# Patient Record
Sex: FEMALE | Race: WHITE | Employment: OTHER | URBAN - METROPOLITAN AREA
[De-identification: names, ages, dates, MRNs, and addresses within clinical notes are randomized per-mention and may not be internally consistent; named-entity substitution may affect disease eponyms.]

---

## 2024-08-01 ENCOUNTER — APPOINTMENT (OUTPATIENT)
Dept: CT IMAGING | Facility: HOSPITAL | Age: 69
End: 2024-08-01
Attending: EMERGENCY MEDICINE
Payer: MEDICAID

## 2024-08-01 ENCOUNTER — HOSPITAL ENCOUNTER (INPATIENT)
Facility: HOSPITAL | Age: 69
LOS: 1 days | Discharge: HOME | End: 2024-08-02
Attending: EMERGENCY MEDICINE | Admitting: HOSPITALIST
Payer: MEDICAID

## 2024-08-01 DIAGNOSIS — R51.9 ACUTE NONINTRACTABLE HEADACHE, UNSPECIFIED HEADACHE TYPE: ICD-10-CM

## 2024-08-01 DIAGNOSIS — E87.1 HYPONATREMIA: Primary | ICD-10-CM

## 2024-08-01 LAB
ALBUMIN SERPL-MCNC: 4.7 G/DL (ref 3.2–4.8)
ALBUMIN/GLOB SERPL: 1.9 {RATIO} (ref 1–2)
ALP LIVER SERPL-CCNC: 99 U/L
ALT SERPL-CCNC: 19 U/L
ANION GAP SERPL CALC-SCNC: 8 MMOL/L (ref 0–18)
AST SERPL-CCNC: 23 U/L (ref ?–34)
BASOPHILS # BLD AUTO: 0.03 X10(3) UL (ref 0–0.2)
BASOPHILS NFR BLD AUTO: 0.5 %
BILIRUB SERPL-MCNC: 0.5 MG/DL (ref 0.2–1.1)
BILIRUB UR QL: NEGATIVE
BUN BLD-MCNC: 10 MG/DL (ref 9–23)
BUN/CREAT SERPL: 14.7 (ref 10–20)
CALCIUM BLD-MCNC: 9.2 MG/DL (ref 8.7–10.4)
CHLORIDE SERPL-SCNC: 92 MMOL/L (ref 98–112)
CLARITY UR: CLEAR
CO2 SERPL-SCNC: 22 MMOL/L (ref 21–32)
CREAT BLD-MCNC: 0.68 MG/DL
DEPRECATED RDW RBC AUTO: 40.6 FL (ref 35.1–46.3)
EGFRCR SERPLBLD CKD-EPI 2021: 94 ML/MIN/1.73M2 (ref 60–?)
EOSINOPHIL # BLD AUTO: 0.05 X10(3) UL (ref 0–0.7)
EOSINOPHIL NFR BLD AUTO: 0.9 %
ERYTHROCYTE [DISTWIDTH] IN BLOOD BY AUTOMATED COUNT: 13.2 % (ref 11–15)
EST. AVERAGE GLUCOSE BLD GHB EST-MCNC: 148 MG/DL (ref 68–126)
FLUAV + FLUBV RNA SPEC NAA+PROBE: NEGATIVE
FLUAV + FLUBV RNA SPEC NAA+PROBE: NEGATIVE
GLOBULIN PLAS-MCNC: 2.5 G/DL (ref 2–3.5)
GLUCOSE BLD-MCNC: 134 MG/DL (ref 70–99)
GLUCOSE BLDC GLUCOMTR-MCNC: 146 MG/DL (ref 70–99)
GLUCOSE UR-MCNC: NORMAL MG/DL
HBA1C MFR BLD: 6.8 % (ref ?–5.7)
HCT VFR BLD AUTO: 32.5 %
HGB BLD-MCNC: 11.2 G/DL
HGB UR QL STRIP.AUTO: NEGATIVE
IMM GRANULOCYTES # BLD AUTO: 0.01 X10(3) UL (ref 0–1)
IMM GRANULOCYTES NFR BLD: 0.2 %
KETONES UR-MCNC: NEGATIVE MG/DL
LEUKOCYTE ESTERASE UR QL STRIP.AUTO: 75
LYMPHOCYTES # BLD AUTO: 2.16 X10(3) UL (ref 1–4)
LYMPHOCYTES NFR BLD AUTO: 37.9 %
MCH RBC QN AUTO: 29 PG (ref 26–34)
MCHC RBC AUTO-ENTMCNC: 34.5 G/DL (ref 31–37)
MCV RBC AUTO: 84.2 FL
MONOCYTES # BLD AUTO: 0.32 X10(3) UL (ref 0.1–1)
MONOCYTES NFR BLD AUTO: 5.6 %
NEUTROPHILS # BLD AUTO: 3.13 X10 (3) UL (ref 1.5–7.7)
NEUTROPHILS # BLD AUTO: 3.13 X10(3) UL (ref 1.5–7.7)
NEUTROPHILS NFR BLD AUTO: 54.9 %
NITRITE UR QL STRIP.AUTO: NEGATIVE
OSMOLALITY SERPL CALC.SUM OF ELEC: 255 MOSM/KG (ref 275–295)
PH UR: 6.5 [PH] (ref 5–8)
PLATELET # BLD AUTO: 292 10(3)UL (ref 150–450)
POTASSIUM SERPL-SCNC: 4 MMOL/L (ref 3.5–5.1)
PROT SERPL-MCNC: 7.2 G/DL (ref 5.7–8.2)
PROT UR-MCNC: NEGATIVE MG/DL
RBC # BLD AUTO: 3.86 X10(6)UL
RSV RNA SPEC NAA+PROBE: NEGATIVE
SARS-COV-2 RNA RESP QL NAA+PROBE: NOT DETECTED
SODIUM SERPL-SCNC: 122 MMOL/L (ref 136–145)
SP GR UR STRIP: 1.01 (ref 1–1.03)
TSI SER-ACNC: 2.55 MIU/ML (ref 0.55–4.78)
UROBILINOGEN UR STRIP-ACNC: NORMAL
WBC # BLD AUTO: 5.7 X10(3) UL (ref 4–11)

## 2024-08-01 PROCEDURE — 70496 CT ANGIOGRAPHY HEAD: CPT | Performed by: EMERGENCY MEDICINE

## 2024-08-01 PROCEDURE — 99223 1ST HOSP IP/OBS HIGH 75: CPT | Performed by: HOSPITALIST

## 2024-08-01 RX ORDER — NICOTINE POLACRILEX 4 MG
15 LOZENGE BUCCAL
Status: DISCONTINUED | OUTPATIENT
Start: 2024-08-01 | End: 2024-08-02

## 2024-08-01 RX ORDER — TEMAZEPAM 15 MG/1
15 CAPSULE ORAL NIGHTLY PRN
Status: DISCONTINUED | OUTPATIENT
Start: 2024-08-01 | End: 2024-08-02

## 2024-08-01 RX ORDER — ONDANSETRON 2 MG/ML
4 INJECTION INTRAMUSCULAR; INTRAVENOUS EVERY 6 HOURS PRN
Status: CANCELLED | OUTPATIENT
Start: 2024-08-01

## 2024-08-01 RX ORDER — LISINOPRIL 20 MG/1
20 TABLET ORAL DAILY
COMMUNITY

## 2024-08-01 RX ORDER — DIPHENHYDRAMINE HYDROCHLORIDE 50 MG/ML
12.5 INJECTION INTRAMUSCULAR; INTRAVENOUS ONCE
Status: COMPLETED | OUTPATIENT
Start: 2024-08-01 | End: 2024-08-01

## 2024-08-01 RX ORDER — SODIUM CHLORIDE 9 MG/ML
INJECTION, SOLUTION INTRAVENOUS ONCE
Status: COMPLETED | OUTPATIENT
Start: 2024-08-01 | End: 2024-08-01

## 2024-08-01 RX ORDER — METOCLOPRAMIDE HYDROCHLORIDE 5 MG/ML
10 INJECTION INTRAMUSCULAR; INTRAVENOUS EVERY 8 HOURS PRN
Status: CANCELLED | OUTPATIENT
Start: 2024-08-01

## 2024-08-01 RX ORDER — NICOTINE POLACRILEX 4 MG
15 LOZENGE BUCCAL
Status: CANCELLED | OUTPATIENT
Start: 2024-08-01

## 2024-08-01 RX ORDER — DEXTROSE MONOHYDRATE AND SODIUM CHLORIDE 5; .9 G/100ML; G/100ML
INJECTION, SOLUTION INTRAVENOUS CONTINUOUS
Status: CANCELLED | OUTPATIENT
Start: 2024-08-01

## 2024-08-01 RX ORDER — NICOTINE POLACRILEX 4 MG
30 LOZENGE BUCCAL
Status: CANCELLED | OUTPATIENT
Start: 2024-08-01

## 2024-08-01 RX ORDER — DEXTROSE MONOHYDRATE 25 G/50ML
50 INJECTION, SOLUTION INTRAVENOUS
Status: DISCONTINUED | OUTPATIENT
Start: 2024-08-01 | End: 2024-08-02

## 2024-08-01 RX ORDER — ACETAMINOPHEN 500 MG
500 TABLET ORAL EVERY 4 HOURS PRN
Status: CANCELLED | OUTPATIENT
Start: 2024-08-01

## 2024-08-01 RX ORDER — ONDANSETRON 2 MG/ML
4 INJECTION INTRAMUSCULAR; INTRAVENOUS EVERY 6 HOURS PRN
Status: DISCONTINUED | OUTPATIENT
Start: 2024-08-01 | End: 2024-08-02

## 2024-08-01 RX ORDER — HEPARIN SODIUM 5000 [USP'U]/ML
5000 INJECTION, SOLUTION INTRAVENOUS; SUBCUTANEOUS EVERY 12 HOURS SCHEDULED
Status: CANCELLED | OUTPATIENT
Start: 2024-08-01

## 2024-08-01 RX ORDER — NICOTINE POLACRILEX 4 MG
30 LOZENGE BUCCAL
Status: DISCONTINUED | OUTPATIENT
Start: 2024-08-01 | End: 2024-08-02

## 2024-08-01 RX ORDER — DEXTROSE MONOHYDRATE 25 G/50ML
50 INJECTION, SOLUTION INTRAVENOUS
Status: CANCELLED | OUTPATIENT
Start: 2024-08-01

## 2024-08-01 RX ORDER — TEMAZEPAM 15 MG/1
15 CAPSULE ORAL NIGHTLY PRN
Status: CANCELLED | OUTPATIENT
Start: 2024-08-01

## 2024-08-01 RX ORDER — METOCLOPRAMIDE HYDROCHLORIDE 5 MG/ML
10 INJECTION INTRAMUSCULAR; INTRAVENOUS EVERY 8 HOURS PRN
Status: DISCONTINUED | OUTPATIENT
Start: 2024-08-01 | End: 2024-08-02

## 2024-08-01 RX ORDER — METOCLOPRAMIDE HYDROCHLORIDE 5 MG/ML
10 INJECTION INTRAMUSCULAR; INTRAVENOUS ONCE
Status: COMPLETED | OUTPATIENT
Start: 2024-08-01 | End: 2024-08-01

## 2024-08-01 RX ORDER — ACETAMINOPHEN 500 MG
500 TABLET ORAL EVERY 4 HOURS PRN
Status: DISCONTINUED | OUTPATIENT
Start: 2024-08-01 | End: 2024-08-02

## 2024-08-01 RX ORDER — ATORVASTATIN CALCIUM 10 MG/1
10 TABLET, FILM COATED ORAL NIGHTLY
COMMUNITY

## 2024-08-01 RX ORDER — SODIUM CHLORIDE 9 MG/ML
INJECTION, SOLUTION INTRAVENOUS CONTINUOUS
Status: DISCONTINUED | OUTPATIENT
Start: 2024-08-01 | End: 2024-08-02

## 2024-08-01 RX ORDER — HEPARIN SODIUM 5000 [USP'U]/ML
5000 INJECTION, SOLUTION INTRAVENOUS; SUBCUTANEOUS EVERY 12 HOURS SCHEDULED
Status: DISCONTINUED | OUTPATIENT
Start: 2024-08-01 | End: 2024-08-02

## 2024-08-01 NOTE — ED PROVIDER NOTES
Patient Seen in: [facility] Emergency Department      History     Chief Complaint   Patient presents with    Headache    Abdominal Pain     Stated Complaint: Headache    Subjective:   HPI    69-year-old female presents for evaluation of headache.  Patient reports headache that began at 930 this morning, progressively worsened.  Was in her usual state of health when she woke up this morning, able to do her usual 2 mile walk without difficulty.  As the day progressed headache worsened, no relief with Tylenol.  This was associated with nausea as well as photophobia.  No fever, cough, recent trauma, vomiting, diarrhea, urinary complaints.    Objective:   History reviewed. No pertinent past medical history.           History reviewed. No pertinent surgical history.             Social History     Socioeconomic History    Marital status: Single   Tobacco Use    Smoking status: Never    Smokeless tobacco: Never   Vaping Use    Vaping status: Never Used   Substance and Sexual Activity    Alcohol use: Never    Drug use: Never              Review of Systems    Positive for stated Chief Complaint: Headache and Abdominal Pain    Other systems are as noted in HPI.  Constitutional and vital signs reviewed.      All other systems reviewed and negative except as noted above.    Physical Exam     ED Triage Vitals [08/01/24 1721]   BP (!) 169/83   Pulse 72   Resp 20   Temp 97.7 °F (36.5 °C)   Temp src Temporal   SpO2 99 %   O2 Device None (Room air)       Current Vitals:   Vital Signs  BP: 132/79  Pulse: 78  Resp: 20  Temp: 97.7 °F (36.5 °C)  Temp src: Temporal  MAP (mmHg): 93    Oxygen Therapy  SpO2: 100 %  O2 Device: None (Room air)            Physical Exam  Vitals and nursing note reviewed.   Constitutional:       General: She is not in acute distress.     Appearance: She is well-developed.   HENT:      Head: Normocephalic and atraumatic.   Eyes:      Extraocular Movements: Extraocular movements intact.       Conjunctiva/sclera: Conjunctivae normal.      Pupils: Pupils are equal, round, and reactive to light.   Cardiovascular:      Rate and Rhythm: Normal rate and regular rhythm.      Heart sounds: Normal heart sounds.   Pulmonary:      Effort: Pulmonary effort is normal. No respiratory distress.      Breath sounds: Normal breath sounds.   Abdominal:      General: Bowel sounds are normal. There is no distension.      Palpations: Abdomen is soft.      Tenderness: There is no abdominal tenderness. There is no guarding or rebound.   Musculoskeletal:         General: Normal range of motion.      Cervical back: Normal range of motion and neck supple.   Skin:     General: Skin is warm and dry.      Findings: No rash.   Neurological:      General: No focal deficit present.      Mental Status: She is alert and oriented to person, place, and time.      Cranial Nerves: No cranial nerve deficit.      Sensory: No sensory deficit.      Motor: No weakness.               ED Course     Labs Reviewed   COMP METABOLIC PANEL (14) - Abnormal; Notable for the following components:       Result Value    Glucose 134 (*)     Sodium 122 (*)     Chloride 92 (*)     Calculated Osmolality 255 (*)     All other components within normal limits   URINALYSIS WITH CULTURE REFLEX - Abnormal; Notable for the following components:    Leukocyte Esterase Urine 75 (*)     Squamous Epi. Cells Few (*)     All other components within normal limits   CBC W/ DIFFERENTIAL - Abnormal; Notable for the following components:    HGB 11.2 (*)     HCT 32.5 (*)     All other components within normal limits   TSH W REFLEX TO FREE T4 - Normal   SARS-COV-2/FLU A AND B/RSV BY PCR (GENEXPERT) - Normal    Narrative:     This test is intended for the qualitative detection and differentiation of SARS-CoV-2, influenza A, influenza B, and respiratory syncytial virus (RSV) viral RNA in nasopharyngeal or nares swabs from individuals suspected of respiratory viral infection consistent  with COVID-19 by their healthcare provider. Signs and symptoms of respiratory viral infection due to SARS-CoV-2, influenza, and RSV can be similar.    Test performed using the Xpert Xpress SARS-CoV-2/FLU/RSV (real time RT-PCR)  assay on the GeneXpert instrument, [employer], [place] [address].   This test is being used under the Food and Drug Administration's Emergency Use Authorization.    The authorized Fact Sheet for Healthcare Providers for this assay is available upon request from the laboratory.   CBC WITH DIFFERENTIAL WITH PLATELET    Narrative:     The following orders were created for panel order CBC With Differential With Platelet.  Procedure                               Abnormality         Status                     ---------                               -----------         ------                     CBC W/ DIFFERENTIAL[[id number]]          Abnormal            Final result                 Please view results for these tests on the individual orders.   RAINBOW DRAW LAVENDER   RAINBOW DRAW LIGHT GREEN   URINE CULTURE, ROUTINE             CTA BRAIN (CPT=70496)    Result Date: 8/1/2024  CONCLUSION:  1.  Noncontrast head CT:  No acute intracranial process.   There are mild microvascular white matter ischemic changes, likely related to long-standing hypertension and/or diabetes. 2.  CTA head:  No stenosis, occlusion, or gross aneurysm in the anterior or posterior circulation.  Dictated by (CST): Wiley Friend MD on 8/01/2024 at 7:26 PM     Finalized by (CST): Wiley Friend MD on 8/01/2024 at 7:30 PM                  MDM        Admission disposition: 8/1/2024  7:21 PM                                        Medical Decision Making  Differential diagnosis includes but is not limited to viral illness, dehydration, tension headache, migraine, malignancy, subarachnoid hemorrhage    Patient with normal exam, sodium of 122 noted with otherwise normal electrolytes and glucose.  Patient reports about a year ago she had  hyponatremia as well, unsure of etiology.  No recent vomiting or diarrhea, not on diuretics.  Plan for IV fluids with migraine medications, patient reports resolution of symptoms after this, feeling much better.  Discussed with and admitted to .    Problems Addressed:  Hyponatremia: acute illness or injury    Amount and/or Complexity of Data Reviewed  External Data Reviewed: labs.     Details: EMR reviewed, no previous labs available for comparison  Labs: ordered.  Radiology: ordered.  Discussion of management or test interpretation with external provider(s): Discussed with and admitted to hospitalist        Disposition and Plan     Clinical Impression:  1. Hyponatremia    2. Acute nonintractable headache, unspecified headache type         Disposition:  Admit  8/1/2024  7:21 pm    Follow-up:  No follow-up provider specified.  We recommend that you schedule follow up care with a primary care provider within the next three months to obtain basic health screening including reassessment of your blood pressure.      Medications Prescribed:  There are no discharge medications for this patient.                        Hospital Problems       Present on Admission             ICD-10-CM Noted POA    * (Principal) Hyponatremia E87.1 8/1/2024 Unknown

## 2024-08-01 NOTE — ED INITIAL ASSESSMENT (HPI)
Pt arrives through triage with daughter      complaints of, HA and abd discomfort/ nausea. Pt report THOMPSON started this AM. Denies vomiting or diarrhea. Denies blurry/ double vision        Hx of DM, HLD, HTN

## 2024-08-02 VITALS
RESPIRATION RATE: 16 BRPM | TEMPERATURE: 97 F | DIASTOLIC BLOOD PRESSURE: 59 MMHG | WEIGHT: 129.81 LBS | HEIGHT: 61 IN | HEART RATE: 75 BPM | BODY MASS INDEX: 24.51 KG/M2 | SYSTOLIC BLOOD PRESSURE: 143 MMHG | OXYGEN SATURATION: 100 %

## 2024-08-02 LAB
ANION GAP SERPL CALC-SCNC: 5 MMOL/L (ref 0–18)
BASOPHILS # BLD AUTO: 0.02 X10(3) UL (ref 0–0.2)
BASOPHILS NFR BLD AUTO: 0.6 %
BUN BLD-MCNC: 7 MG/DL (ref 9–23)
BUN/CREAT SERPL: 10.8 (ref 10–20)
CALCIUM BLD-MCNC: 8.8 MG/DL (ref 8.7–10.4)
CHLORIDE SERPL-SCNC: 103 MMOL/L (ref 98–112)
CO2 SERPL-SCNC: 23 MMOL/L (ref 21–32)
CREAT BLD-MCNC: 0.65 MG/DL
DEPRECATED RDW RBC AUTO: 41.1 FL (ref 35.1–46.3)
EGFRCR SERPLBLD CKD-EPI 2021: 95 ML/MIN/1.73M2 (ref 60–?)
EOSINOPHIL # BLD AUTO: 0.06 X10(3) UL (ref 0–0.7)
EOSINOPHIL NFR BLD AUTO: 1.9 %
ERYTHROCYTE [DISTWIDTH] IN BLOOD BY AUTOMATED COUNT: 13.4 % (ref 11–15)
GLUCOSE BLD-MCNC: 134 MG/DL (ref 70–99)
GLUCOSE BLDC GLUCOMTR-MCNC: 119 MG/DL (ref 70–99)
GLUCOSE BLDC GLUCOMTR-MCNC: 144 MG/DL (ref 70–99)
HCT VFR BLD AUTO: 31.4 %
HGB BLD-MCNC: 10.8 G/DL
IMM GRANULOCYTES # BLD AUTO: 0.01 X10(3) UL (ref 0–1)
IMM GRANULOCYTES NFR BLD: 0.3 %
LYMPHOCYTES # BLD AUTO: 1.27 X10(3) UL (ref 1–4)
LYMPHOCYTES NFR BLD AUTO: 40.8 %
MAGNESIUM SERPL-MCNC: 1.7 MG/DL (ref 1.6–2.6)
MCH RBC QN AUTO: 28.7 PG (ref 26–34)
MCHC RBC AUTO-ENTMCNC: 34.4 G/DL (ref 31–37)
MCV RBC AUTO: 83.5 FL
MONOCYTES # BLD AUTO: 0.2 X10(3) UL (ref 0.1–1)
MONOCYTES NFR BLD AUTO: 6.4 %
NEUTROPHILS # BLD AUTO: 1.55 X10 (3) UL (ref 1.5–7.7)
NEUTROPHILS # BLD AUTO: 1.55 X10(3) UL (ref 1.5–7.7)
NEUTROPHILS NFR BLD AUTO: 50 %
OSMOLALITY SERPL CALC.SUM OF ELEC: 272 MOSM/KG (ref 275–295)
PLATELET # BLD AUTO: 268 10(3)UL (ref 150–450)
POTASSIUM SERPL-SCNC: 4.1 MMOL/L (ref 3.5–5.1)
RBC # BLD AUTO: 3.76 X10(6)UL
SODIUM SERPL-SCNC: 131 MMOL/L (ref 136–145)
WBC # BLD AUTO: 3.1 X10(3) UL (ref 4–11)

## 2024-08-02 PROCEDURE — 99239 HOSP IP/OBS DSCHRG MGMT >30: CPT | Performed by: HOSPITALIST

## 2024-08-02 RX ORDER — ATORVASTATIN CALCIUM 10 MG/1
10 TABLET, FILM COATED ORAL NIGHTLY
Status: DISCONTINUED | OUTPATIENT
Start: 2024-08-02 | End: 2024-08-02

## 2024-08-02 RX ORDER — MAGNESIUM OXIDE 400 MG/1
400 TABLET ORAL ONCE
Status: COMPLETED | OUTPATIENT
Start: 2024-08-02 | End: 2024-08-02

## 2024-08-02 RX ORDER — LISINOPRIL 20 MG/1
20 TABLET ORAL DAILY
Status: DISCONTINUED | OUTPATIENT
Start: 2024-08-02 | End: 2024-08-02

## 2024-08-02 RX ORDER — PANTOPRAZOLE SODIUM 40 MG/1
40 TABLET, DELAYED RELEASE ORAL
Status: DISCONTINUED | OUTPATIENT
Start: 2024-08-02 | End: 2024-08-02

## 2024-08-02 NOTE — PLAN OF CARE
Patient is from home w/ daughter. A&Ox4. Room air. Hep subcutaneous. Patient is a self in room. Ready for discharge. IV removed. Tele removed and returned. Nurse  cleared. Patient's son here and available for transportation home.     Problem: Patient Centered Care  Goal: Patient preferences are identified and integrated in the patient's plan of care  Description: Interventions:  - What would you like us to know as we care for you? From home w/ daughter  - Provide timely, complete, and accurate information to patient/family  - Incorporate patient and family knowledge, values, beliefs, and cultural backgrounds into the planning and delivery of care  - Encourage patient/family to participate in care and decision-making at the level they choose  - Honor patient and family perspectives and choices  Outcome: Progressing     Problem: Diabetes/Glucose Control  Goal: Glucose maintained within prescribed range  Description: INTERVENTIONS:  - Monitor Blood Glucose as ordered  - Assess for signs and symptoms of hyperglycemia and hypoglycemia  - Administer ordered medications to maintain glucose within target range  - Assess barriers to adequate nutritional intake and initiate nutrition consult as needed  - Instruct patient on self management of diabetes  Outcome: Progressing     Problem: Patient/Family Goals  Goal: Patient/Family Long Term Goal  Description: Patient's Long Term Goal: To be discharged    Interventions:  - Assist patient to all tests and procedures  - See additional Care Plan goals for specific interventions  Outcome: Progressing  Goal: Patient/Family Short Term Goal  Description: Patient's Short Term Goal: To feel better    Interventions:   - Assist patients to all tests and procedures  - See additional Care Plan goals for specific interventions  Outcome: Progressing     Problem: CARDIOVASCULAR - ADULT  Goal: Maintains optimal cardiac output and hemodynamic stability  Description: INTERVENTIONS:  - Monitor  vital signs, rhythm, and trends  - Monitor for bleeding, hypotension and signs of decreased cardiac output  - Evaluate effectiveness of vasoactive medications to optimize hemodynamic stability  - Monitor arterial and/or venous puncture sites for bleeding and/or hematoma  - Assess quality of pulses, skin color and temperature  - Assess for signs of decreased coronary artery perfusion - ex. Angina  - Evaluate fluid balance, assess for edema, trend weights  Outcome: Progressing  Goal: Absence of cardiac arrhythmias or at baseline  Description: INTERVENTIONS:  - Continuous cardiac monitoring, monitor vital signs, obtain 12 lead EKG if indicated  - Evaluate effectiveness of antiarrhythmic and heart rate control medications as ordered  - Initiate emergency measures for life threatening arrhythmias  - Monitor electrolytes and administer replacement therapy as ordered  Outcome: Progressing     Problem: METABOLIC/FLUID AND ELECTROLYTES - ADULT  Goal: Glucose maintained within prescribed range  Description: INTERVENTIONS:  - Monitor Blood Glucose as ordered  - Assess for signs and symptoms of hyperglycemia and hypoglycemia  - Administer ordered medications to maintain glucose within target range  - Assess barriers to adequate nutritional intake and initiate nutrition consult as needed  - Instruct patient on self management of diabetes  Outcome: Progressing  Goal: Electrolytes maintained within normal limits  Description: INTERVENTIONS:  - Monitor labs and rhythm and assess patient for signs and symptoms of electrolyte imbalances  - Administer electrolyte replacement as ordered  - Monitor response to electrolyte replacements, including rhythm and repeat lab results as appropriate  - Fluid restriction as ordered  - Instruct patient on fluid and nutrition restrictions as appropriate  Outcome: Progressing  Goal: Hemodynamic stability and optimal renal function maintained  Description: INTERVENTIONS:  - Monitor labs and assess for  signs and symptoms of volume excess or deficit  - Monitor intake, output and patient weight  - Monitor urine specific gravity, serum osmolarity and serum sodium as indicated or ordered  - Monitor response to interventions for patient's volume status, including labs, urine output, blood pressure (other measures as available)  - Encourage oral intake as appropriate  - Instruct patient on fluid and nutrition restrictions as appropriate  Outcome: Progressing     Problem: SAFETY ADULT - FALL  Goal: Free from fall injury  Description: INTERVENTIONS:  - Assess pt frequently for physical needs  - Identify cognitive and physical deficits and behaviors that affect risk of falls.  - [facility] fall precautions as indicated by assessment.  - Educate pt/family on patient safety including physical limitations  - Instruct pt to call for assistance with activity based on assessment  - Modify environment to reduce risk of injury  - Provide assistive devices as appropriate  - Consider OT/PT consult to assist with strengthening/mobility  - Encourage toileting schedule  Outcome: Progressing     Problem: DISCHARGE PLANNING  Goal: Discharge to home or other facility with appropriate resources  Description: INTERVENTIONS:  - Identify barriers to discharge w/pt and caregiver  - Include patient/family/discharge partner in discharge planning  - Arrange for needed discharge resources and transportation as appropriate  - Identify discharge learning needs (meds, wound care, etc)  - Arrange for interpreters to assist at discharge as needed  - Consider post-discharge preferences of patient/family/discharge partner  - Complete POLST form as appropriate  - Assess patient's ability to be responsible for managing their own health  - Refer to Case Management Department for coordinating discharge planning if the patient needs post-hospital services based on physician/LIP order or complex needs related to functional status, cognitive ability or social  support system  Outcome: Progressing

## 2024-08-02 NOTE — PAYOR COMM NOTE
--------------  ADMISSION REVIEW     Payor: [facility]  Subscriber #:  [id number]  Authorization Number: [id number]    Admit date: 8/1/24  Admit time:  8:55 PM       REVIEW DOCUMENTATION:    Patient Seen in: [facility] Emergency Department      History     Chief Complaint   Patient presents with    Headache    Abdominal Pain     Stated Complaint: Headache    Subjective:   HPI    69-year-old female presents for evaluation of headache.  Patient reports headache that began at 930 this morning, progressively worsened.  Was in her usual state of health when she woke up this morning, able to do her usual 2 mile walk without difficulty.  As the day progressed headache worsened, no relief with Tylenol.  This was associated with nausea as well as photophobia.  No fever, cough, recent trauma, vomiting, diarrhea, urinary complaints.      Physical Exam     ED Triage Vitals [08/01/24 1721]   BP (!) 169/83   Pulse 72   Resp 20   Temp 97.7 °F (36.5 °C)   Temp src Temporal   SpO2 99 %   O2 Device None (Room air)       Current Vitals:   Vital Signs  BP: 132/79  Pulse: 78  Resp: 20  Temp: 97.7 °F (36.5 °C)  Temp src: Temporal  MAP (mmHg): 93    Oxygen Therapy  SpO2: 100 %  O2 Device: None (Room air)      Physical Exam  Vitals and nursing note reviewed.   Constitutional:       General: She is not in acute distress.     Appearance: She is well-developed.   HENT:      Head: Normocephalic and atraumatic.   Eyes:      Extraocular Movements: Extraocular movements intact.      Conjunctiva/sclera: Conjunctivae normal.      Pupils: Pupils are equal, round, and reactive to light.   Cardiovascular:      Rate and Rhythm: Normal rate and regular rhythm.      Heart sounds: Normal heart sounds.   Pulmonary:      Effort: Pulmonary effort is normal. No respiratory distress.      Breath sounds: Normal breath sounds.   Abdominal:      General: Bowel sounds are normal. There is no distension.      Palpations:  Abdomen is soft.      Tenderness: There is no abdominal tenderness. There is no guarding or rebound.   Musculoskeletal:         General: Normal range of motion.      Cervical back: Normal range of motion and neck supple.   Skin:     General: Skin is warm and dry.      Findings: No rash.   Neurological:      General: No focal deficit present.      Mental Status: She is alert and oriented to person, place, and time.      Cranial Nerves: No cranial nerve deficit.      Sensory: No sensory deficit.      Motor: No weakness.         ED Course     Labs Reviewed   COMP METABOLIC PANEL (14) - Abnormal; Notable for the following components:       Result Value    Glucose 134 (*)     Sodium 122 (*)     Chloride 92 (*)     Calculated Osmolality 255 (*)     All other components within normal limits   URINALYSIS WITH CULTURE REFLEX - Abnormal; Notable for the following components:    Leukocyte Esterase Urine 75 (*)     Squamous Epi. Cells Few (*)     All other components within normal limits   CBC W/ DIFFERENTIAL - Abnormal; Notable for the following components:    HGB 11.2 (*)     HCT 32.5 (*)     All other components within normal limits   TSH W REFLEX TO FREE T4 - Normal   SARS-COV-2/FLU A AND B/RSV BY PCR (GENEXPERT) - Normal   URINE CULTURE, ROUTINE      CTA BRAIN (CPT=70496)    Result Date: 8/1/2024  CONCLUSION:  1.  Noncontrast head CT:  No acute intracranial process.   There are mild microvascular white matter ischemic changes, likely related to long-standing hypertension and/or diabetes. 2.  CTA head:  No stenosis, occlusion, or gross aneurysm in the anterior or posterior circulation.    Disposition and Plan     Clinical Impression:  1. Hyponatremia    2. Acute nonintractable headache, unspecified headache type         Disposition:  Admit  8/1/2024  7:21 pm    Signed by Meghan Guy MD on 8/1/2024  8:20 PM             HISTORY AND PHYSICAL EXAMINATION     CHIEF COMPLAINT:  Hypoosmolar, hyponatremia.      HISTORY OF  PRESENT ILLNESS:  Patient is a 69-year-old  female who came into the Emergency Department for further evaluation of tension-type headache since this morning.  CBC showed white blood cell count of 5.7, hemoglobin 11.7.  Chemistry showed sodium 122, chloride 92.  Calculated osmolality 255.  Urinalysis was unremarkable.  GeneXpert panel was negative.  TSH with reflex T4 still pending.  CT scan of the brain still pending.  Patient will be admitted to the hospital for further management.        ASSESSMENT:    1.       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  CT scan of the brain still pending.  Other inducers of hypoosmolar or hyponatremia are pain associated with headache and nausea.  2.       Essential hypertension.   3.       Diabetes mellitus type 2.      PLAN:  Patient will be admitted to general medical floor, remote telemetry.  Will restrict free water to 1200 mL daily.  Gentle slow infusion of normal saline.  Monitor her chemistry in the morning.  Obtain TSH with reflex T4, full precautions.  Monitor Accu-Cheks      MEDICATIONS ADMINISTERED IN LAST 1 DAY:  diphenhydrAMINE (Benadryl) 50 mg/mL  injection 12.5 mg       Date Action Dose Route User    8/1/2024 1845 Given 12.5 mg Intravenous ImRobbi padilla RN          heparin (Porcine) 5000 UNIT/ML injection 5,000 Units       Date Action Dose Route User    8/2/2024 0909 Given 5,000 Units Subcutaneous (Right Lower Abdomen) Mirian Wiggins RN    8/1/2024 2330 Given 5,000 Units Subcutaneous (Left Lower Abdomen) Dara Dotson RN          iopamidol 76% (ISOVUE-370) injection for power injector       Date Action Dose Route User    8/1/2024 1911 Given 80 mL Intravenous Deyanira Velasco          lisinopril (Prinivil; Zestril) tab 20 mg       Date Action Dose Route User    8/2/2024 0910 Given 20 mg Oral Mirian Wiggins RN          magnesium oxide (Mag-Ox) tab 400 mg       Date Action Dose Route User    8/2/2024 0910 Given 400 mg  Oral Mirian Wiggins RN          metoclopramide (Reglan) 5 mg/mL injection 10 mg       Date Action Dose Route User    8/1/2024 1846 Given 10 mg Intravenous Robbi Jacobs RN          pantoprazole (Protonix) DR tab 40 mg       Date Action Dose Route User    8/2/2024 0607 Given 40 mg Oral OrtuostDara syed RN          sodium chloride 0.9% infusion       Date Action Dose Route User    8/2/2024 0512 New Bag (none) Intravenous OrtjuanstDara syed RN          sodium chloride 0.9% infusion 83 ml hr       Date Action Dose Route User    8/1/2024 1955 New Bag (none) Intravenous Robbi Jacobs RN          sodium chloride 0.9 % IV bolus 1,000 mL       Date Action Dose Route User    8/1/2024 1830 New Bag 1,000 mL Intravenous Robbi Jacobs RN            Vitals (last day)       Date/Time Temp Pulse Resp BP SpO2 Weight O2 Device O2 Flow Rate (L/min) [facility]    08/02/24 0858 97.3 °F (36.3 °C) 75 16 143/59 100 % -- None (Room air) -- JL    08/02/24 0511 97.9 °F (36.6 °C) 72 15 138/70 97 % -- None (Room air) -- GO    08/02/24 0500 -- -- -- -- -- 129 lb 12.8 oz (58.9 kg) -- -- FS    08/01/24 2110 98 °F (36.7 °C) 67 14 122/65 98 % -- None (Room air) -- GO    08/01/24 2059 -- 70 -- -- -- -- -- -- GT    08/01/24 2015 -- 71 -- 119/62 100 % -- -- -- KI    08/01/24 1930 -- 78 -- 132/79 100 % -- -- -- KI    08/01/24 1822 -- -- -- -- -- -- None (Room air) -- KI    08/01/24 1721 97.7 °F (36.5 °C) 72 20 169/83 99 % 135 lb (61.2 kg) None (Room air) -- KS

## 2024-08-02 NOTE — H&P
[facility]    PATIENT'S NAME: CAITY GASTELUM   ATTENDING PHYSICIAN: Meghan Guy MD   PATIENT ACCOUNT#:   [id number]    LOCATION:  [address]  MEDICAL RECORD #:   [id number]       YOB: 1955  ADMISSION DATE:       08/01/2024    HISTORY AND PHYSICAL EXAMINATION    CHIEF COMPLAINT:  Hypoosmolar, hyponatremia.     HISTORY OF PRESENT ILLNESS:  Patient is a 69-year-old  female who came into the Emergency Department for further evaluation of tension-type headache since this morning.  CBC showed white blood cell count of 5.7, hemoglobin 11.7.  Chemistry showed sodium 122, chloride 92.  Calculated osmolality 255.  Urinalysis was unremarkable.  GeneXpert panel was negative.  TSH with reflex T4 still pending.  CT scan of the brain still pending.  Patient will be admitted to the hospital for further management.     REVIEW OF SYSTEMS:  Patient has had tension-type headaches started around noon today and it has been persistent, bifrontal and occipital.  Toward the evening she started having blurred vision.  She denies any recent trauma.  Patient reported that she drinks 3 bottles of water every day, 1 bottle in the morning with her medication, then another bottle at night with medication and she finishes a bottle after lunch.  Around 11 a.m. today started developing tension headache, which has been progressive and getting worse.  Toward the end of the day started having blurred vision.  No upper or lower extremity weakness, tingling or numbness.       PAST MEDICAL HISTORY:  Hypertension, hyperlipidemia, non-insulin-dependent diabetes mellitus type 2, generalized osteoarthritis.    PAST SURGICAL HISTORY:  Appendectomy.    ALLERGIES:  No known drug allergies.    SOCIAL HISTORY:  No tobacco, alcohol or drug use.  Lives with her family.  Active, usually independent for basic activities of daily living.     FAMILY HISTORY:  Positive for diabetes mellitus type 2 and  hypertension.    PHYSICAL EXAMINATION:    GENERAL:  GENERAL:  Alert and oriented to time, place, and person.  No acute distress, anxious.  VITAL SIGNS:  Temperature 97.7, pulse 72, respiratory rate 20, blood pressure 169/83, pulse ox 99% on room air.    HEENT:  Atraumatic.  Oropharynx clear.  Moist mucous membranes.  Ears, nose normal.  Eyes:  Anicteric sclerae.    NECK:  Supple.  No lymphadenopathy.  Trachea midline.  Full range of motion.  LUNGS:  Clear to auscultation bilaterally.  Normal respiratory effort.    HEART:  Regular rate, rhythm.  S1 and S2 auscultated.  No murmur.   ABDOMEN:  Soft, nondistended.  No tenderness.  Positive bowel sounds.    EXTREMITIES:  No peripheral edema, clubbing or cyanosis.   NEUROLOGIC:  Motor and sensory intact.      ASSESSMENT:    1.   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  CT scan of the brain still pending.  Other inducers of hypoosmolar or hyponatremia are pain associated with headache and nausea.  2.   Essential hypertension.   3.   Diabetes mellitus type 2.     PLAN:  Patient will be admitted to general medical floor, remote telemetry.  Will restrict free water to 1200 mL daily.  Gentle slow infusion of normal saline.  Monitor her chemistry in the morning.  Obtain TSH with reflex T4, full precautions.  Monitor Accu-Cheks.  Further recommendations to follow.     Dictated By Eliane Espinosa MD  d: 08/01/2024 19:21:07  t: 08/01/2024 20:58:27  Job [id number]  FB/

## 2024-08-02 NOTE — ED QUICK NOTES
Orders for admission, patient is aware of plan and ready to go upstairs. Any questions, please call ED MARISEL bonilla at extension [phone].     Patient Covid vaccination status: Unvaccinated     COVID Test Ordered in ED: SARS-CoV-2/Flu A and B/RSV by PCR (GeneXpert)    COVID Suspicion at Admission: N/A    Running Infusions:      Mental Status/LOC at time of transport: a/o x4    Other pertinent information:   CIWA score: N/A   NIH score:  N/A

## 2024-08-02 NOTE — PLAN OF CARE
Ivf continues. Stable vital signs. Monitoring electrolytes.     Problem: Patient Centered Care  Goal: Patient preferences are identified and integrated in the patient's plan of care  Description: Interventions:  - What would you like us to know as we care for you? Home with daughter.   - Provide timely, complete, and accurate information to patient/family  - Incorporate patient and family knowledge, values, beliefs, and cultural backgrounds into the planning and delivery of care  - Encourage patient/family to participate in care and decision-making at the level they choose  - Honor patient and family perspectives and choices  Outcome: Progressing     Problem: Diabetes/Glucose Control  Goal: Glucose maintained within prescribed range  Description: INTERVENTIONS:  - Monitor Blood Glucose as ordered  - Assess for signs and symptoms of hyperglycemia and hypoglycemia  - Administer ordered medications to maintain glucose within target range  - Assess barriers to adequate nutritional intake and initiate nutrition consult as needed  - Instruct patient on self management of diabetes  Outcome: Progressing     Problem: CARDIOVASCULAR - ADULT  Goal: Maintains optimal cardiac output and hemodynamic stability  Description: INTERVENTIONS:  - Monitor vital signs, rhythm, and trends  - Monitor for bleeding, hypotension and signs of decreased cardiac output  - Evaluate effectiveness of vasoactive medications to optimize hemodynamic stability  - Monitor arterial and/or venous puncture sites for bleeding and/or hematoma  - Assess quality of pulses, skin color and temperature  - Assess for signs of decreased coronary artery perfusion - ex. Angina  - Evaluate fluid balance, assess for edema, trend weights  Outcome: Progressing  Goal: Absence of cardiac arrhythmias or at baseline  Description: INTERVENTIONS:  - Continuous cardiac monitoring, monitor vital signs, obtain 12 lead EKG if indicated  - Evaluate effectiveness of antiarrhythmic  and heart rate control medications as ordered  - Initiate emergency measures for life threatening arrhythmias  - Monitor electrolytes and administer replacement therapy as ordered  Outcome: Progressing     Problem: METABOLIC/FLUID AND ELECTROLYTES - ADULT  Goal: Glucose maintained within prescribed range  Description: INTERVENTIONS:  - Monitor Blood Glucose as ordered  - Assess for signs and symptoms of hyperglycemia and hypoglycemia  - Administer ordered medications to maintain glucose within target range  - Assess barriers to adequate nutritional intake and initiate nutrition consult as needed  - Instruct patient on self management of diabetes  Outcome: Progressing  Goal: Electrolytes maintained within normal limits  Description: INTERVENTIONS:  - Monitor labs and rhythm and assess patient for signs and symptoms of electrolyte imbalances  - Administer electrolyte replacement as ordered  - Monitor response to electrolyte replacements, including rhythm and repeat lab results as appropriate  - Fluid restriction as ordered  - Instruct patient on fluid and nutrition restrictions as appropriate  Outcome: Progressing  Goal: Hemodynamic stability and optimal renal function maintained  Description: INTERVENTIONS:  - Monitor labs and assess for signs and symptoms of volume excess or deficit  - Monitor intake, output and patient weight  - Monitor urine specific gravity, serum osmolarity and serum sodium as indicated or ordered  - Monitor response to interventions for patient's volume status, including labs, urine output, blood pressure (other measures as available)  - Encourage oral intake as appropriate  - Instruct patient on fluid and nutrition restrictions as appropriate  Outcome: Progressing     Problem: SAFETY ADULT - FALL  Goal: Free from fall injury  Description: INTERVENTIONS:  - Assess pt frequently for physical needs  - Identify cognitive and physical deficits and behaviors that affect risk of falls.  - [place]  fall precautions as indicated by assessment.  - Educate pt/family on patient safety including physical limitations  - Instruct pt to call for assistance with activity based on assessment  - Modify environment to reduce risk of injury  - Provide assistive devices as appropriate  - Consider OT/PT consult to assist with strengthening/mobility  - Encourage toileting schedule  Outcome: Progressing     Problem: DISCHARGE PLANNING  Goal: Discharge to home or other facility with appropriate resources  Description: INTERVENTIONS:  - Identify barriers to discharge w/pt and caregiver  - Include patient/family/discharge partner in discharge planning  - Arrange for needed discharge resources and transportation as appropriate  - Identify discharge learning needs (meds, wound care, etc)  - Arrange for interpreters to assist at discharge as needed  - Consider post-discharge preferences of patient/family/discharge partner  - Complete POLST form as appropriate  - Assess patient's ability to be responsible for managing their own health  - Refer to Case Management Department for coordinating discharge planning if the patient needs post-hospital services based on physician/LIP order or complex needs related to functional status, cognitive ability or social support system  Outcome: Progressing

## 2024-08-02 NOTE — DISCHARGE SUMMARY
[facility]  part of [facility]    Discharge Summary    Sonam Lucas Patient Status:  Inpatient    1955 MRN [id number]   Location [facility] 3W/SW Attending Marco A Calvin MD   Hosp Day # 1 PCP None Pcp     Date of Admission: 2024 Disposition: Home    Date of Discharge: 24    Admitting Diagnosis: Hyponatremia [E87.1]  Acute nonintractable headache, unspecified headache type [R51.9]    Hospital Discharge Diagnoses: Hypoosmolar hyponatremia     Lace+ Score: 34  59-90 High Risk  29-58 Medium Risk  0-28   Low Risk.    TCM Follow-Up Recommendation:  LACE 29-58: Moderate Risk of readmission after discharge from the hospital.    Problem List:   Patient Active Problem List   Diagnosis    Hyponatremia    Acute nonintractable headache, unspecified headache type       Reason for Admission: headache    Physical Exam:   Vitals:    24 0858   BP: 143/59   Pulse: 75   Resp: 16   Temp: 97.3 °F (36.3 °C)     GENERAL:  GENERAL:  Alert and oriented to time, place, and person.  No acute distress, anxious.  HEENT:  Atraumatic.  Oropharynx clear.  Moist mucous membranes.  Ears, nose normal.  Eyes:  Anicteric sclerae.    NECK:  Supple.  No lymphadenopathy.  Trachea midline.  Full range of motion.  LUNGS:  Clear to auscultation bilaterally.  Normal respiratory effort.    HEART:  Regular rate, rhythm.  S1 and S2 auscultated.  No murmur.   ABDOMEN:  Soft, nondistended.  No tenderness.  Positive bowel sounds.    EXTREMITIES:  No peripheral edema, clubbing or cyanosis.   NEUROLOGIC:  Motor and sensory intact.     History of Present Illness:   Per Dr. Espinosa  Patient is a 69-year-old  female who came into the Emergency Department for further evaluation of tension-type headache since this morning. CBC showed white blood cell count of 5.7, hemoglobin 11.7. Chemistry showed sodium 122, chloride 92. Calculated osmolality 255. Urinalysis was unremarkable. GeneXpert panel was  negative. TSH with reflex T4 still pending. CT scan of the brain still pending. Patient will be admitted to the hospital for further management.     Hospital Course:   1.       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    CT head WNL  Pt was started on IVF and Na has improved this   Pt would like to go home  Will need repeat labs in 1 week to confirm resolution  D/w with pt and family at bedside    2.       Essential hypertension.   Cont home med    3.       Diabetes mellitus type 2.   Cont home med    Consultations: none    Procedures: none    Complications: none    Discharge Condition: Stable    Discharge Medications:      Discharge Medications        CONTINUE taking these medications        Instructions Prescription details   atorvastatin 10 MG Tabs  Commonly known as: Lipitor      Take 1 tablet (10 mg total) by mouth nightly.   Refills: 0     Centrum Cardio Tabs      Take 1,000 tablets by mouth daily.   Refills: 0     Esomeprazole Magnesium 20 MG Cpdr  Commonly known as: NEXIUM      Take 2 capsules (40 mg total) by mouth every morning before breakfast.   Refills: 0     lisinopril 20 MG Tabs  Commonly known as: Prinivil; Zestril      Take 1 tablet (20 mg total) by mouth daily.   Refills: 0     metFORMIN HCl 1000 MG Tabs  Commonly known as: GLUCOPHAGE      Take 0.5 tablets (500 mg total) by mouth 2 (two) times daily with meals.   Refills: 0              Greater than 35 minutes spent, >50% spent counseling re: treatment plan and workup     Marco A Calvin MD  8/2/2024

## 2024-08-05 ENCOUNTER — PATIENT OUTREACH (OUTPATIENT)
Dept: CASE MANAGEMENT | Age: 69
End: 2024-08-05

## 2024-08-05 NOTE — PROGRESS NOTES
NII attempted to reach the patient to complete a HFU call. Someone answered the phone and the NCM heard someone speaking in the background - but no one spoke with NII directly. NII hung up and called the patient again, and left a detailed message to call back. NII provided direct contact info at [phone]. Will follow up at a later time.

## 2024-08-06 NOTE — PROGRESS NOTES
Initial Post Discharge Follow Up   Discharge Date: 8/2/24  Contact Date: 8/5/2024    Consent Verification:  Assessment Completed With: Patient  HIPAA Verified?  Yes    Discharge Dx:   Hypoosmolar hyponatremia     General:   How have you been since your discharge from the hospital?   Patient states she has been doing well but her blood sugar has been high in the morning. Patient reports sugars have been around 180, 177, 151 in the morning before breakfast. Patient states they seem like they are coming down but they are still high. Patient denies chest pain, heart palpitations, shortness of breath, headaches, fever, chills, nausea, vomiting, diarrhea. Patient states she is being more careful with water intake. Patient states she has been walking a little more since being home from the hospital. She states she usually goes outside for walks and is trying to get back to this.     [facility] confirmed with patient she has a PCP---  Dr. Maher at [facility]. She has not contacted her PCP as of yet. [facility] advised patient she needs to contact her PCP for a hospital follow up appointment. [facility] also advised patient there is an order in for a renal function panel to be completed by 08/09/2024. Patient states she will go to [place] lab to have completed and will follow up with her PCP at [place] for results. Patient states she will call her PCP today or tomorrow.